# Patient Record
Sex: FEMALE | Race: OTHER
[De-identification: names, ages, dates, MRNs, and addresses within clinical notes are randomized per-mention and may not be internally consistent; named-entity substitution may affect disease eponyms.]

---

## 2019-02-04 ENCOUNTER — HOSPITAL ENCOUNTER (EMERGENCY)
Dept: HOSPITAL 54 - ER | Age: 59
Discharge: HOME | End: 2019-02-04
Payer: COMMERCIAL

## 2019-02-04 VITALS — WEIGHT: 293 LBS | BODY MASS INDEX: 44.41 KG/M2 | HEIGHT: 68 IN

## 2019-02-04 VITALS — SYSTOLIC BLOOD PRESSURE: 134 MMHG | DIASTOLIC BLOOD PRESSURE: 76 MMHG

## 2019-02-04 DIAGNOSIS — I10: ICD-10-CM

## 2019-02-04 DIAGNOSIS — E11.9: ICD-10-CM

## 2019-02-04 DIAGNOSIS — Z88.2: ICD-10-CM

## 2019-02-04 DIAGNOSIS — Z88.5: ICD-10-CM

## 2019-02-04 DIAGNOSIS — Z88.0: ICD-10-CM

## 2019-02-04 DIAGNOSIS — J95.03: Primary | ICD-10-CM

## 2019-02-04 PROCEDURE — 99284 EMERGENCY DEPT VISIT MOD MDM: CPT

## 2019-02-04 NOTE — NUR
Patient discharged to [facility] in stable condition. Written and verbal after care 
instructions given. Patient verbalizes understanding of instruction.